# Patient Record
(demographics unavailable — no encounter records)

---

## 2020-03-21 NOTE — ULT
EXAM: Pelvic ultrasound



HISTORY: Left lower quadrant pain in a pregnant female



COMPARISON: None



TECHNIQUE: Multiple grayscale and color Doppler images were obtained in a transabdominal pelvic ultra
sound. Spectral analysis of the Doppler waveforms of the ovaries were performed.





FINDINGS:

UTERUS: There is an intrauterine gestational sac. This contains a yolk sac and fetal pole.

Crown-rump length: 14 mm which estimates gestational age at 7 weeks 5 days.

A fetal heart rate is detected at 152 bpm.

No evidence of subchorionic hemorrhage.



A small amount of  free fluid is seen in the pelvis. 



RIGHT OVARY: Normal flow without focal mass.

LEFT OVARY: Normal flow without focal mass.



IMPRESSION: Single live intrauterine pregnancy with estimated age of 7 weeks 5 days.



Reported By: Harshil Chen 

Electronically Signed:  3/21/2020 9:11 PM